# Patient Record
Sex: MALE | Race: WHITE | NOT HISPANIC OR LATINO | Employment: OTHER | URBAN - METROPOLITAN AREA
[De-identification: names, ages, dates, MRNs, and addresses within clinical notes are randomized per-mention and may not be internally consistent; named-entity substitution may affect disease eponyms.]

---

## 2020-09-17 ENCOUNTER — HOSPITAL ENCOUNTER (EMERGENCY)
Facility: HOSPITAL | Age: 25
Discharge: HOME | End: 2020-09-17
Attending: EMERGENCY MEDICINE
Payer: COMMERCIAL

## 2020-09-17 VITALS
HEART RATE: 88 BPM | SYSTOLIC BLOOD PRESSURE: 147 MMHG | OXYGEN SATURATION: 99 % | RESPIRATION RATE: 20 BRPM | DIASTOLIC BLOOD PRESSURE: 81 MMHG | TEMPERATURE: 98 F | BODY MASS INDEX: 42.95 KG/M2 | HEIGHT: 69 IN | WEIGHT: 290 LBS

## 2020-09-17 DIAGNOSIS — M54.32 BILATERAL SCIATICA: Primary | ICD-10-CM

## 2020-09-17 DIAGNOSIS — M54.31 BILATERAL SCIATICA: Primary | ICD-10-CM

## 2020-09-17 PROCEDURE — 96372 THER/PROPH/DIAG INJ SC/IM: CPT | Mod: ER

## 2020-09-17 PROCEDURE — 25000003 PHARM REV CODE 250: Mod: ER | Performed by: EMERGENCY MEDICINE

## 2020-09-17 PROCEDURE — 63600175 PHARM REV CODE 636 W HCPCS: Mod: ER | Performed by: EMERGENCY MEDICINE

## 2020-09-17 PROCEDURE — 99284 EMERGENCY DEPT VISIT MOD MDM: CPT | Mod: 25,ER

## 2020-09-17 RX ORDER — METHOCARBAMOL 750 MG/1
1500 TABLET, FILM COATED ORAL
Status: COMPLETED | OUTPATIENT
Start: 2020-09-17 | End: 2020-09-17

## 2020-09-17 RX ORDER — KETOROLAC TROMETHAMINE 30 MG/ML
30 INJECTION, SOLUTION INTRAMUSCULAR; INTRAVENOUS
Status: COMPLETED | OUTPATIENT
Start: 2020-09-17 | End: 2020-09-17

## 2020-09-17 RX ORDER — KETOROLAC TROMETHAMINE 10 MG/1
10 TABLET, FILM COATED ORAL EVERY 6 HOURS PRN
Qty: 12 TABLET | Refills: 0 | Status: SHIPPED | OUTPATIENT
Start: 2020-09-17 | End: 2020-09-20

## 2020-09-17 RX ORDER — METHOCARBAMOL 750 MG/1
1500 TABLET, FILM COATED ORAL EVERY 6 HOURS
Qty: 24 TABLET | Refills: 0 | Status: SHIPPED | OUTPATIENT
Start: 2020-09-17 | End: 2020-09-20

## 2020-09-17 RX ADMIN — METHOCARBAMOL 1500 MG: 750 TABLET ORAL at 10:09

## 2020-09-17 RX ADMIN — KETOROLAC TROMETHAMINE 30 MG: 30 INJECTION, SOLUTION INTRAMUSCULAR at 10:09

## 2020-09-18 NOTE — ED PROVIDER NOTES
Encounter Date: 9/17/2020    SCRIBE #1 NOTE: I, Merced Woodruff, am scribing for, and in the presence of,  Dr. Ray. I have scribed the following portions of the note - Other sections scribed: HPI, ROS, PE.       History     Chief Complaint   Patient presents with    Leg Pain     PT REPORTS PAIN FROM LOW BACK RADIATING DOWN RIGHT BUTTOCK AND RIGHT LEG SINCE YESTERDAY    Sciatica     Kirit Christianson is a 24 y.o. obese male who presents to the ED complaining of acute, nontraumatic bilateral buttock pain that radiates down legs bilaterally, with intermittent numbness to the right buttock area that began yesterday. He reports that the pain began gradually yesterday, and denies know inciting event, strenuous or unusual activity recently. Reports pain exacerbated by walking. Denies saddle anesthesia, fever, bowel or bladder incontinence, urinary symptoms. Denies history of back problems.      The history is provided by the patient. No  was used.     Review of patient's allergies indicates:  No Known Allergies  History reviewed. No pertinent past medical history.  History reviewed. No pertinent surgical history.  No family history on file.  Social History     Tobacco Use    Smoking status: Never Smoker    Smokeless tobacco: Never Used   Substance Use Topics    Alcohol use: Never     Frequency: Never    Drug use: Never     Review of Systems   Constitutional: Negative for fever.   Genitourinary: Negative for difficulty urinating, dysuria, enuresis, frequency, hematuria and urgency.   Musculoskeletal: Positive for myalgias (bilateral gluteal). Negative for back pain, gait problem and joint swelling.   Neurological: Positive for numbness (intermittent, currently resolved). Negative for weakness.   All other systems reviewed and are negative.      Physical Exam     Initial Vitals [09/17/20 2121]   BP Pulse Resp Temp SpO2   (!) 147/81 88 20 97.9 °F (36.6 °C) 99 %      MAP       --         Physical  Exam    Nursing note and vitals reviewed.  Constitutional: He appears well-developed and well-nourished. He is Obese .   HENT:   Head: Normocephalic and atraumatic.   Nose: Nose normal.   Eyes: Conjunctivae are normal.   Neck: Normal range of motion and phonation normal. Neck supple. No stridor present.   Cardiovascular: Normal rate and intact distal pulses.   Pulmonary/Chest: Effort normal. No stridor. No respiratory distress.   Abdominal: Normal appearance.   Musculoskeletal: Normal range of motion. No edema.      Right hip: Normal.      Lumbar back: He exhibits tenderness and spasm. He exhibits no bony tenderness and no swelling.        Back:       Comments: Negative straight leg raise. Patient localizes pain to bilateral gluteal area.   Neurological: He is alert and oriented to person, place, and time. Gait normal.   Skin: Skin is warm, dry and intact. No abrasion, no rash and no abscess noted. No erythema.   Psychiatric: He has a normal mood and affect. His behavior is normal.         ED Course   Procedures  Labs Reviewed - No data to display       Imaging Results    None          Medical Decision Making:   History:   Old Medical Records: I decided to obtain old medical records.            Scribe Attestation:   Scribe #1: I performed the above scribed service and the documentation accurately describes the services I performed. I attest to the accuracy of the note.    Attending Attestation:           Physician Attestation for Scribe:  Physician Attestation Statement for Scribe #1: I, Milo Ray, reviewed documentation, as scribed by Merced Woodruff in my presence, and it is both accurate and complete.           Labs Reviewed      No visits with results within 1 Day(s) from this visit.   Latest known visit with results is:   No results found for any previous visit.        Imaging Reviewed    Imaging Results    None         Medications given in ED    Medications   ketorolac injection 30 mg (30 mg Intramuscular Given  9/17/20 2224)   methocarbamoL tablet 1,500 mg (1,500 mg Oral Given 9/17/20 2223)         Note was created using voice recognition software. Note may have occasional typographical errors that may not have been identified and edited despite good roberta initial review prior to signing.               ED Course as of Sep 18 0153   Thu Sep 17, 2020   2242 Patient states he feels much better after meds    [DL]      ED Course User Index  [DL] Milo Ray MD            Clinical Impression:     ICD-10-CM ICD-9-CM   1. Bilateral sciatica  M54.31 724.3    M54.32                    1. Bilateral sciatica            ED Disposition Condition    Discharge Stable        ED Prescriptions     Medication Sig Dispense Start Date End Date Auth. Provider    ketorolac (TORADOL) 10 mg tablet Take 1 tablet (10 mg total) by mouth every 6 (six) hours as needed for Pain (take with food). 12 tablet 9/17/2020 9/20/2020 Milo Ray MD    methocarbamoL (ROBAXIN) 750 MG Tab Take 2 tablets (1,500 mg total) by mouth every 6 (six) hours. for 3 days 24 tablet 9/17/2020 9/20/2020 Milo Ray MD        Follow-up Information     Follow up With Specialties Details Why Contact Info    Your PCP  Call in 1 day to schedule an appointment, for re-evaluation of today's complaint, and ongoing care                                        Milo Ray MD  09/18/20 0153

## 2020-10-18 ENCOUNTER — HOSPITAL ENCOUNTER (EMERGENCY)
Facility: HOSPITAL | Age: 25
Discharge: HOME | End: 2020-10-18
Attending: EMERGENCY MEDICINE
Payer: COMMERCIAL

## 2020-10-18 VITALS
DIASTOLIC BLOOD PRESSURE: 69 MMHG | HEART RATE: 65 BPM | RESPIRATION RATE: 17 BRPM | OXYGEN SATURATION: 100 % | WEIGHT: 290 LBS | BODY MASS INDEX: 42.83 KG/M2 | SYSTOLIC BLOOD PRESSURE: 138 MMHG | TEMPERATURE: 98 F

## 2020-10-18 DIAGNOSIS — S91.114A: ICD-10-CM

## 2020-10-18 DIAGNOSIS — S92.531B: ICD-10-CM

## 2020-10-18 DIAGNOSIS — R60.9 SWELLING: ICD-10-CM

## 2020-10-18 DIAGNOSIS — S92.421B OPEN DISPLACED FRACTURE OF DISTAL PHALANX OF RIGHT GREAT TOE, INITIAL ENCOUNTER: ICD-10-CM

## 2020-10-18 DIAGNOSIS — S91.111A LACERATION OF GREAT TOE OF RIGHT FOOT, FOREIGN BODY PRESENCE UNSPECIFIED, NAIL DAMAGE STATUS UNSPECIFIED, INITIAL ENCOUNTER: Primary | ICD-10-CM

## 2020-10-18 LAB
ALBUMIN SERPL-MCNC: 4 G/DL (ref 3.3–5.5)
ALP SERPL-CCNC: 79 U/L (ref 42–141)
BILIRUB SERPL-MCNC: 0.4 MG/DL (ref 0.2–1.6)
BUN SERPL-MCNC: 13 MG/DL (ref 7–22)
CALCIUM SERPL-MCNC: 9.5 MG/DL (ref 8–10.3)
CHLORIDE SERPL-SCNC: 104 MMOL/L (ref 98–108)
CREAT SERPL-MCNC: 1 MG/DL (ref 0.6–1.2)
GLUCOSE SERPL-MCNC: 103 MG/DL (ref 73–118)
POC ALT (SGPT): 32 U/L (ref 10–47)
POC AST (SGOT): 28 U/L (ref 11–38)
POC TCO2: 30 MMOL/L (ref 18–33)
POTASSIUM BLD-SCNC: 3.8 MMOL/L (ref 3.6–5.1)
PROTEIN, POC: 8 G/DL (ref 6.4–8.1)
SODIUM BLD-SCNC: 140 MMOL/L (ref 128–145)

## 2020-10-18 PROCEDURE — 96366 THER/PROPH/DIAG IV INF ADDON: CPT | Mod: ER

## 2020-10-18 PROCEDURE — 12002 RPR S/N/AX/GEN/TRNK2.6-7.5CM: CPT | Mod: ER

## 2020-10-18 PROCEDURE — 96367 TX/PROPH/DG ADDL SEQ IV INF: CPT | Mod: ER,59

## 2020-10-18 PROCEDURE — 80053 COMPREHEN METABOLIC PANEL: CPT | Mod: ER

## 2020-10-18 PROCEDURE — 25000003 PHARM REV CODE 250: Mod: ER | Performed by: EMERGENCY MEDICINE

## 2020-10-18 PROCEDURE — 63600175 PHARM REV CODE 636 W HCPCS: Mod: ER | Performed by: EMERGENCY MEDICINE

## 2020-10-18 PROCEDURE — 85025 COMPLETE CBC W/AUTO DIFF WBC: CPT | Mod: ER

## 2020-10-18 PROCEDURE — 96368 THER/DIAG CONCURRENT INF: CPT | Mod: ER,59

## 2020-10-18 PROCEDURE — 96365 THER/PROPH/DIAG IV INF INIT: CPT | Mod: ER,59

## 2020-10-18 PROCEDURE — 96375 TX/PRO/DX INJ NEW DRUG ADDON: CPT | Mod: ER,59

## 2020-10-18 PROCEDURE — 90471 IMMUNIZATION ADMIN: CPT | Mod: ER | Performed by: EMERGENCY MEDICINE

## 2020-10-18 PROCEDURE — 99291 CRITICAL CARE FIRST HOUR: CPT | Mod: 25,ER

## 2020-10-18 PROCEDURE — 90715 TDAP VACCINE 7 YRS/> IM: CPT | Mod: ER | Performed by: EMERGENCY MEDICINE

## 2020-10-18 RX ORDER — VANCOMYCIN HCL IN 5 % DEXTROSE 1G/250ML
1000 PLASTIC BAG, INJECTION (ML) INTRAVENOUS
Status: COMPLETED | OUTPATIENT
Start: 2020-10-18 | End: 2020-10-18

## 2020-10-18 RX ORDER — MORPHINE SULFATE 4 MG/ML
4 INJECTION, SOLUTION INTRAMUSCULAR; INTRAVENOUS
Status: COMPLETED | OUTPATIENT
Start: 2020-10-18 | End: 2020-10-18

## 2020-10-18 RX ORDER — CEFAZOLIN SODIUM 1 G/50ML
1 SOLUTION INTRAVENOUS
Status: COMPLETED | OUTPATIENT
Start: 2020-10-18 | End: 2020-10-18

## 2020-10-18 RX ORDER — IBUPROFEN 600 MG/1
600 TABLET ORAL EVERY 6 HOURS PRN
Qty: 30 TABLET | Refills: 0 | Status: SHIPPED | OUTPATIENT
Start: 2020-10-18

## 2020-10-18 RX ORDER — BUPIVACAINE HYDROCHLORIDE 5 MG/ML
10 INJECTION, SOLUTION EPIDURAL; INTRACAUDAL
Status: COMPLETED | OUTPATIENT
Start: 2020-10-18 | End: 2020-10-18

## 2020-10-18 RX ORDER — MUPIROCIN 20 MG/G
OINTMENT TOPICAL
Status: COMPLETED | OUTPATIENT
Start: 2020-10-18 | End: 2020-10-18

## 2020-10-18 RX ORDER — LIDOCAINE HYDROCHLORIDE 10 MG/ML
10 INJECTION INFILTRATION; PERINEURAL
Status: COMPLETED | OUTPATIENT
Start: 2020-10-18 | End: 2020-10-18

## 2020-10-18 RX ORDER — OXYCODONE AND ACETAMINOPHEN 5; 325 MG/1; MG/1
1 TABLET ORAL
Status: DISCONTINUED | OUTPATIENT
Start: 2020-10-18 | End: 2020-10-18

## 2020-10-18 RX ORDER — ACETAMINOPHEN 500 MG
500 TABLET ORAL EVERY 6 HOURS PRN
Qty: 30 TABLET | Refills: 0 | Status: SHIPPED | OUTPATIENT
Start: 2020-10-18

## 2020-10-18 RX ORDER — CEFAZOLIN SODIUM 1 G/50ML
1 SOLUTION INTRAVENOUS ONCE
Status: COMPLETED | OUTPATIENT
Start: 2020-10-18 | End: 2020-10-18

## 2020-10-18 RX ORDER — OXYCODONE AND ACETAMINOPHEN 5; 325 MG/1; MG/1
1 TABLET ORAL EVERY 4 HOURS PRN
Qty: 12 TABLET | Refills: 0 | Status: SHIPPED | OUTPATIENT
Start: 2020-10-18

## 2020-10-18 RX ORDER — SULFAMETHOXAZOLE AND TRIMETHOPRIM 800; 160 MG/1; MG/1
2 TABLET ORAL 2 TIMES DAILY
Qty: 40 TABLET | Refills: 0 | Status: SHIPPED | OUTPATIENT
Start: 2020-10-18 | End: 2020-10-28

## 2020-10-18 RX ORDER — OXYCODONE AND ACETAMINOPHEN 5; 325 MG/1; MG/1
1 TABLET ORAL
Status: COMPLETED | OUTPATIENT
Start: 2020-10-18 | End: 2020-10-18

## 2020-10-18 RX ORDER — MUPIROCIN 20 MG/G
OINTMENT TOPICAL 3 TIMES DAILY
Qty: 1 TUBE | Refills: 0 | Status: SHIPPED | OUTPATIENT
Start: 2020-10-18 | End: 2020-10-28

## 2020-10-18 RX ADMIN — SODIUM CHLORIDE 1000 ML: 0.9 INJECTION, SOLUTION INTRAVENOUS at 03:10

## 2020-10-18 RX ADMIN — OXYCODONE HYDROCHLORIDE AND ACETAMINOPHEN 1 TABLET: 5; 325 TABLET ORAL at 03:10

## 2020-10-18 RX ADMIN — LIDOCAINE HYDROCHLORIDE 10 ML: 10 INJECTION, SOLUTION INFILTRATION; PERINEURAL at 04:10

## 2020-10-18 RX ADMIN — MUPIROCIN: 20 OINTMENT TOPICAL at 06:10

## 2020-10-18 RX ADMIN — VANCOMYCIN HYDROCHLORIDE 1000 MG: 1 INJECTION, POWDER, LYOPHILIZED, FOR SOLUTION INTRAVENOUS at 05:10

## 2020-10-18 RX ADMIN — MORPHINE SULFATE 4 MG: 4 INJECTION INTRAVENOUS at 03:10

## 2020-10-18 RX ADMIN — CLOSTRIDIUM TETANI TOXOID ANTIGEN (FORMALDEHYDE INACTIVATED), CORYNEBACTERIUM DIPHTHERIAE TOXOID ANTIGEN (FORMALDEHYDE INACTIVATED), BORDETELLA PERTUSSIS TOXOID ANTIGEN (GLUTARALDEHYDE INACTIVATED), BORDETELLA PERTUSSIS FILAMENTOUS HEMAGGLUTININ ANTIGEN (FORMALDEHYDE INACTIVATED), BORDETELLA PERTUSSIS PERTACTIN ANTIGEN, AND BORDETELLA PERTUSSIS FIMBRIAE 2/3 ANTIGEN 0.5 ML: 5; 2; 2.5; 5; 3; 5 INJECTION, SUSPENSION INTRAMUSCULAR at 03:10

## 2020-10-18 RX ADMIN — CEFAZOLIN SODIUM 1 G: 1 SOLUTION INTRAVENOUS at 06:10

## 2020-10-18 RX ADMIN — CEFAZOLIN SODIUM 1 G: 1 SOLUTION INTRAVENOUS at 03:10

## 2020-10-18 RX ADMIN — BUPIVACAINE HYDROCHLORIDE 50 MG: 5 INJECTION, SOLUTION EPIDURAL; INTRACAUDAL; PERINEURAL at 04:10

## 2020-10-18 NOTE — Clinical Note
"Kennedyas "Kirit" Sammy was seen and treated in our emergency department on 10/18/2020.  He may return to work after being cleared by follow-up physician 10/25/2020.       If you have any questions or concerns, please don't hesitate to call.      Argenis Heredia, DO"

## 2020-10-18 NOTE — ED PROVIDER NOTES
"Encounter Date: 10/18/2020    SCRIBE #1 NOTE: I, Sandra Stephenson, am scribing for, and in the presence of,  Dr. Heredia. I have scribed the following portions of the note - Other sections scribed: HPI, ROS, PE.       History     Chief Complaint   Patient presents with    Laceration     Pt states," I cut my right big toe and the toe next to it today with a ."     Kirit Christianson is a 25 y.o. male who presents to the ED complaining of a laceration and pain s/p lawn  accident after tripping and falling onset PTA. He describes the pain as "burning" and rates the severity as 8/10 currently. Patient last ate around 12PM. Denies alcohol use, smoking, marijuana or street drug use. Patient has no known allergies. Denies past medical history and does not take medication daily. Tetanus is not UTD.    The history is provided by the patient. No  was used.     Review of patient's allergies indicates:  No Known Allergies  History reviewed. No pertinent past medical history.  History reviewed. No pertinent surgical history.  History reviewed. No pertinent family history.  Social History     Tobacco Use    Smoking status: Never Smoker    Smokeless tobacco: Never Used   Substance Use Topics    Alcohol use: Never     Frequency: Never    Drug use: Never     Review of Systems   Constitutional: Negative for chills and fever.   Respiratory: Negative for shortness of breath.    Cardiovascular: Negative for chest pain.   Gastrointestinal: Negative for diarrhea, nausea and vomiting.   Genitourinary: Negative for dysuria.   Skin: Positive for wound (with pain to 1st and 2nd right toes ).   Neurological: Negative for headaches.   All other systems reviewed and are negative.      Physical Exam     Initial Vitals [10/18/20 1450]   BP Pulse Resp Temp SpO2   (!) 143/80 79 16 98 °F (36.7 °C) 100 %      MAP       --         Patient gave consent to have physical exam performed.    Physical Exam    Nursing note and vitals " reviewed.  Constitutional: He appears well-developed and well-nourished.   HENT:   Head: Normocephalic and atraumatic.   Right Ear: External ear normal.   Left Ear: External ear normal.   Nose: Nose normal.   Mouth/Throat: Oropharynx is clear and moist.   Eyes: Conjunctivae and EOM are normal. Pupils are equal, round, and reactive to light.   Neck: Normal range of motion. Neck supple.   Cardiovascular: Normal rate, regular rhythm, S1 normal, S2 normal, normal heart sounds and intact distal pulses. Exam reveals no gallop and no friction rub.    No murmur heard.  Pulmonary/Chest: Effort normal and breath sounds normal. No stridor. No respiratory distress. He has no wheezes. He has no rhonchi. He has no rales. He exhibits no tenderness.   Abdominal: Soft. Normal appearance and bowel sounds are normal. He exhibits no distension and no mass. There is no abdominal tenderness. There is no rigidity, no rebound and no guarding.   Musculoskeletal: Normal range of motion.        Right foot: Laceration present.      Comments: Multiple lacerations to bilateral toes, 1st and 2nd. (refer to images): Superficial lacerations to distal right big toe, 3 cm laceration to right big toe, v-shaped laceration on 2nd toe 0.5 cm in each direction, and 1.5 cm laceration to base of 2nd toe. Patient cut his nail on right big toe, but there was no damage to nailbed.   Neurological: He is alert and oriented to person, place, and time. No cranial nerve deficit or sensory deficit. GCS score is 15. GCS eye subscore is 4. GCS verbal subscore is 5. GCS motor subscore is 6.   Skin: Skin is warm and dry. Capillary refill takes less than 2 seconds. No rash noted.   Psychiatric: He has a normal mood and affect. His behavior is normal.                 ED Course   Critical Care    Date/Time: 10/18/2020 4:44 PM  Performed by: Argenis Heredia DO  Authorized by: Argenis Heredia DO   Direct patient critical care time: 30 minutes  Additional history critical care  time: 8 minutes  Ordering / reviewing critical care time: 8 minutes  Documentation critical care time: 8 minutes  Consulting other physicians critical care time: 15 minutes  Total critical care time (exclusive of procedural time) : 69 minutes  Critical care was necessary to treat or prevent imminent or life-threatening deterioration of the following conditions: trauma.  Critical care was time spent personally by me on the following activities: development of treatment plan with patient or surrogate, discussions with consultants, evaluation of patient's response to treatment, examination of patient, obtaining history from patient or surrogate, ordering and performing treatments and interventions, ordering and review of laboratory studies, ordering and review of radiographic studies, pulse oximetry, re-evaluation of patient's condition and review of old charts.    Lac Repair    Date/Time: 10/18/2020 4:58 PM  Performed by: Argenis Heredia DO  Authorized by: Argenis Heredia DO     Consent:     Consent obtained:  Verbal    Consent given by:  Patient    Risks discussed:  Pain, infection, retained foreign body and need for additional repair (fracture)  Universal protocol:     Procedure explained and questions answered to patient or proxy's satisfaction: yes      Relevant documents present and verified: yes      Test results available and properly labeled: yes      Imaging studies available: yes      Site/side marked: yes      Patient identity confirmed:  Verbally with patient  Anesthesia (see MAR for exact dosages):     Anesthesia method:  Nerve block    Block location:  1st and 2nd right toes    Block needle gauge:  27 G    Block anesthetic:  Lidocaine 1% w/o epi and bupivacaine 0.5% w/o epi    Block injection procedure:  Introduced needle    Block outcome:  Anesthesia achieved  Laceration details:     Location:  Toe    Toe location: 1st and 2nd right toes.  Pre-procedure details:     Preparation:  Patient was prepped and  draped in usual sterile fashion and imaging obtained to evaluate for foreign bodies  Exploration:     Wound extent: foreign bodies/material      Contaminated: yes    Treatment:     Area cleansed with:  Betadine    Amount of cleaning:  Extensive    Irrigation solution:  Sterile water    Irrigation method:  Syringe  Skin repair:     Repair method:  Sutures    Suture size:  3-0    Suture material:  Prolene  Post-procedure details:     Dressing:  Splint for protection and antibiotic ointment    Patient tolerance of procedure:  Tolerated well, no immediate complications  Comments:      Superficial lacerations to distal right big toe approximated with Dermabond, 3 cm laceration to right big toe loosely approximated with 4 sutures, v-shaped laceration on 2nd toe 0.5 cm in each direction loosely approximated with 3 sutures, and 1.5 cm laceration to base of 2nd toe closely approximated with 2 sutures.  Nerve Block    Date/Time: 10/18/2020 5:14 PM  Location procedure was performed: [facility] EMERGENCY DEPARTMENT  Performed by: Argenis Heredia DO  Authorized by: Argenis Heredia DO   Consent Done: Yes  Consent: Verbal consent obtained.  Risks and benefits: risks, benefits and alternatives were discussed  Consent given by: patient  Patient understanding: patient states understanding of the procedure being performed  Patient consent: the patient's understanding of the procedure matches consent given  Procedure consent: procedure consent matches procedure scheduled  Site marked: the operative site was marked  Imaging studies: imaging studies available  Patient identity confirmed: verbally with patient  Indications: pain relief, fracture and debridement  Body area: lower extremity  Nerve: digital  Laterality: right  Patient sedated: no  Preparation: Patient was prepped and draped in the usual sterile fashion.  Patient position: sitting  Needle size: 27 G  Local Anesthetic: bupivacaine 0.5% without epinephrine and lidocaine 1% without  epinephrine  Anesthetic total: 5 mL  Complications: No  Specimens: No  Implants: No  Outcome: pain improved  Patient tolerance: Patient tolerated the procedure well with no immediate complications    Splint Application    Date/Time: 10/18/2020 6:41 PM  Performed by: Argenis Heredia DO  Authorized by: Argenis Heredia DO   Consent Done: Yes  Consent: Verbal consent obtained.  Risks and benefits: risks, benefits and alternatives were discussed  Consent given by: patient  Patient understanding: patient states understanding of the procedure being performed  Patient consent: the patient's understanding of the procedure matches consent given  Procedure consent: procedure consent matches procedure scheduled  Relevant documents: relevant documents present and verified  Test results: test results available and properly labeled  Site marked: the operative site was marked  Imaging studies: imaging studies available  Patient identity confirmed: verbally with patient  Location: right foot.  Splint type: pre-fabricated walking boot.  Post-procedure: The splinted body part was neurovascularly unchanged following the procedure.  Patient tolerance: Patient tolerated the procedure well with no immediate complications        Labs Reviewed   POCT CBC   POCT CMP   POCT CMP              Imaging Results          X-Ray Foot Complete Right (Final result)  Result time 10/18/20 15:25:43    Final result by Glenn Azul MD (10/18/20 15:25:43)                 Impression:      1. Fractures of the 1st and 2nd toes as described above noting possible radiopaque foreign body within the plantar soft tissues.  Please see above.      Electronically signed by: Glenn Azul MD  Date:    10/18/2020  Time:    15:25             Narrative:    EXAMINATION:  XR FOOT COMPLETE 3 VIEW RIGHT    CLINICAL HISTORY:  . Edema, unspecified    TECHNIQUE:  AP, lateral, and oblique views of the right foot were performed.    COMPARISON:  None    FINDINGS:  Three views  right foot.    There is obliquely oriented fracture of the distal phalanx of the 2nd toe.  Additional impacted fracture is noted of the distal aspect of the proximal phalanx of the 2nd toe.  Distal phalanx fracture may involve the lateral aspect of the DIP joint.  There is a well corticated ossific fragment along the medial aspect of the great toe DIP joint, concerning for small avulsion fragment.  There is edema involving the toes and along the plantar aspect at the level of the metatarsophalangeal joints.  In this location, there are a few high attenuating foci, possibly radiopaque foreign bodies versus material external to the patient, correlation is advised.                                 Medical Decision Making:   History:   Old Medical Records: I decided to obtain old medical records.  Clinical Tests:   Radiological Study: Reviewed and Ordered  Medical decision making   Chief complaint: laceration and pain  Differential diagnosis: laceration, abrasion, fracture, open fracture, and toe amputation.    Treatment in the ED: PE, IV fluids. Tdap, Percocet, Ancef, and morphine.  Patient reports feeling better after treatment in the ER.      Discussed treatment, prescriptions, labs, and imaging results.    4:12 PM I talked to the podiatry resident on call for [facility], Dr. Colby, and he said that he will call back.   4:20 PM Podiatry staff declined the patient and recommends to consulting the on-call orthopedic physician for traumatic injury. 4:27 PM I spoke to the orthopedic doctor, Dr. Rodriguez. He wants 2 g of Ancef and 1 g of Vancomycin.  Copious irrigation, repair in the ER, and follow-up in clinic 1:00 p.m. on Tuesday the 20th.  Discussed discharge plan at length with patient and family.  They expressed understanding.    Discharge home with Bactrim, ibuprofen, Tylenol, Percocet, and Bactroban.   Fill and take prescriptions as directed.  Return to the ED if symptoms worsen or do not resolve.   Answered  questions and discussed discharge plan.    Patient feels better and is ready for discharge.  Follow up with PCP/specialist in 1 day.          Scribe Attestation:   Scribe #1: I performed the above scribed service and the documentation accurately describes the services I performed. I attest to the accuracy of the note.     I, Dr. Argenis Heredia, personally performed the services described in this documentation. This document was produced by a scribe under my direction and in my presence. All medical record entries made by the scribe were at my direction and in my presence.  I have reviewed the chart and agree that the record reflects my personal performance and is accurate and complete. Argenis Heredia DO.     10/21/2020 7:50 AM                    Clinical Impression:       ICD-10-CM ICD-9-CM   1. Laceration of great toe of right foot, foreign body presence unspecified, nail damage status unspecified, initial encounter  S91.111A 893.0   2. Swelling  R60.9 782.3   3. Laceration of lesser toe of right foot, foreign body presence unspecified, nail damage status unspecified, initial encounter  S91.114A 893.0   4. Open displaced fracture of distal phalanx of right great toe, initial encounter  S92.421B 826.1   5. Open fracture of distal phalanx of lesser toe of right foot, physeal involvement unspecified, initial encounter  S92.531B 826.1                          ED Disposition Condition    Discharge Stable        ED Prescriptions     Medication Sig Dispense Start Date End Date Auth. Provider    sulfamethoxazole-trimethoprim 800-160mg (BACTRIM DS) 800-160 mg Tab Take 2 tablets by mouth 2 (two) times daily. for 10 days 40 tablet 10/18/2020 10/28/2020 Argenis Heredia DO    ibuprofen (ADVIL,MOTRIN) 600 MG tablet Take 1 tablet (600 mg total) by mouth every 6 (six) hours as needed for Pain (Take with food as needed for mild-to-moderate pain). 30 tablet 10/18/2020  Argenis Heredia DO    acetaminophen (TYLENOL) 500 MG tablet  Take 1 tablet (500 mg total) by mouth every 6 (six) hours as needed for Pain. 30 tablet 10/18/2020  Argenis Heredia DO    oxyCODONE-acetaminophen (PERCOCET) 5-325 mg per tablet Take 1 tablet by mouth every 4 (four) hours as needed for Pain (As needed for severe 10/10 pain). 12 tablet 10/18/2020  Argenis Heredia DO    mupirocin (BACTROBAN) 2 % ointment Apply topically 3 (three) times daily. for 10 days 1 Tube 10/18/2020 10/28/2020 Argenis Heredia DO        Follow-up Information     Follow up With Specialties Details Why Contact Info    Rolando Rodriguez MD Orthopedic Surgery  [address] [facility] [address] [facility] [address] [place] [address] [phone] [address]  [facility]  [place] [address]  [phone]      [facility] Orthopedic Surgery, Bone Marrow Transplant Go to  Please follow-up in clinic at 1:00 p.m. on Tuesday the 20th.  Call clinic 1st thing Monday morning to let them know that you are supposed to see the on-call orthopedic doctor at 1:00 a.m. on Tuesday the 20th they will which on the schedule [address] [place] [address]  [phone]      [facility] Emergency Medicine Go to  Please go to [facility] emergency department if symptoms worsen, If symptoms worsen [address]  [facility] [id number] [phone]                                       Argenis Heredia DO  10/21/20 3840

## 2020-10-18 NOTE — DISCHARGE INSTRUCTIONS
Go to the clinic at [address] [place] [address] [phone]    Please follow-up in clinic at 1:00 p.m. on Tuesday the 20th.    Call clinic at [phone] 1st thing Monday morning to let them know that you are supposed to see the on-call orthopedic doctor at 1:00 a.m. on Tuesday the 20th they put you on the schedule.

## 2024-06-11 ENCOUNTER — TELEPHONE (OUTPATIENT)
Dept: UROLOGY | Facility: CLINIC | Age: 29
End: 2024-06-11

## 2024-06-11 NOTE — TELEPHONE ENCOUNTER
Call was placed to patient informing him of any completed SA patient stated e went to [facility] he completed 2 I informed him I will get the results emailed over from .

## 2024-06-28 ENCOUNTER — PATIENT MESSAGE (OUTPATIENT)
Dept: UROLOGY | Facility: CLINIC | Age: 29
End: 2024-06-28

## 2024-07-01 ENCOUNTER — TELEPHONE (OUTPATIENT)
Dept: UROLOGY | Facility: CLINIC | Age: 29
End: 2024-07-01

## 2024-07-01 ENCOUNTER — OFFICE VISIT (OUTPATIENT)
Dept: UROLOGY | Facility: CLINIC | Age: 29
End: 2024-07-01

## 2024-07-01 ENCOUNTER — LAB VISIT (OUTPATIENT)
Dept: LAB | Facility: HOSPITAL | Age: 29
End: 2024-07-01
Attending: UROLOGY

## 2024-07-01 VITALS
SYSTOLIC BLOOD PRESSURE: 141 MMHG | HEART RATE: 68 BPM | BODY MASS INDEX: 48.09 KG/M2 | WEIGHT: 315 LBS | DIASTOLIC BLOOD PRESSURE: 92 MMHG

## 2024-07-01 DIAGNOSIS — E29.1 TESTICULAR FAILURE: ICD-10-CM

## 2024-07-01 DIAGNOSIS — E29.1 TESTICULAR FAILURE: Primary | ICD-10-CM

## 2024-07-01 LAB
ESTRADIOL SERPL-MCNC: 20 PG/ML (ref 11–44)
FSH SERPL-ACNC: 4.63 MIU/ML (ref 0.95–11.95)
LH SERPL-ACNC: 2.4 MIU/ML (ref 0.6–12.1)
PROLACTIN SERPL IA-MCNC: 9.9 NG/ML (ref 3.5–19.4)
TESTOST SERPL-MCNC: 252 NG/DL (ref 304–1227)

## 2024-07-01 PROCEDURE — 83001 ASSAY OF GONADOTROPIN (FSH): CPT | Performed by: UROLOGY

## 2024-07-01 PROCEDURE — 84403 ASSAY OF TOTAL TESTOSTERONE: CPT | Performed by: UROLOGY

## 2024-07-01 PROCEDURE — 36415 COLL VENOUS BLD VENIPUNCTURE: CPT | Performed by: UROLOGY

## 2024-07-01 PROCEDURE — 99213 OFFICE O/P EST LOW 20 MIN: CPT | Mod: PBBFAC | Performed by: UROLOGY

## 2024-07-01 PROCEDURE — 82670 ASSAY OF TOTAL ESTRADIOL: CPT | Performed by: UROLOGY

## 2024-07-01 PROCEDURE — 84146 ASSAY OF PROLACTIN: CPT | Performed by: UROLOGY

## 2024-07-01 PROCEDURE — 99999 PR PBB SHADOW E&M-EST. PATIENT-LVL III: CPT | Mod: PBBFAC,,, | Performed by: UROLOGY

## 2024-07-01 PROCEDURE — 83002 ASSAY OF GONADOTROPIN (LH): CPT | Performed by: UROLOGY

## 2024-07-01 PROCEDURE — 99204 OFFICE O/P NEW MOD 45 MIN: CPT | Mod: S$PBB,,, | Performed by: UROLOGY

## 2024-07-01 NOTE — LETTER
July 1, 2024        Raven Lucia MD  [address]  [facility]  [facility] [address]             [facility] 4th Fl  [address]  [place] [address]  Phone: [phone]   Patient: Kirit Christianson   MR Number: [id number]   YOB: 1995   Date of Visit: 7/1/2024       Dear Dr. Lucia:    Thank you for referring Kirit Christianson to me for evaluation. Attached you will find relevant portions of my assessment and plan of care.    If you have questions, please do not hesitate to call me. I look forward to following Kirit Christianson along with you.    Sincerely,      Moy Shields MD            CC  No Recipients    Enclosure

## 2024-07-01 NOTE — TELEPHONE ENCOUNTER
Per MD: Please notify T is low.  It should be repeated between 7-10 am.    Pt verbalized understanding. Lab scheduled for 7/2, in the only available slot at noon. Pt instructed to go before 10am.

## 2024-07-01 NOTE — PROGRESS NOTES
"Chief Complaint:  Infertility    HPI:    Mr. Christianson is a 28 y.o.  male who has been  to his wife for the past 2 years. They have been trying to achieve a pregnancy for the past 2 years but without success. 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 He denies a history of erectile dysfunction and ejaculatory problems.    He has achieved 0 pregnancies in the past.    Karyotype and Y chromosome microdeltion.    SA 24--2.3 cc/azoospermia  SA 24--2.5 cc/0.4 million per cc/50%/1%    Keiry Casarez is 20 years old. ( [birth year]) Her menses are regular. She has not undergone prior hysterosalpingogram. She has achieved 0 prior pregnancies.  She sees Dr. Lucia.    The couple has not undergone prior intrauterine insemination procedures.    The couple has not undergone prior in-vitro fertilization procedures.    Kirit Christianson denies a history of exposure to harmful chemicals, toxins, and radiation.    No history of recent fevers greater than 101.5 degrees Farenheit.    No history of recent exposure to "wet heat."    No history of urological trauma or testicular torsion.    No history of prostatitis, epididymitis, and orchitis.    No history of post-pubertal mumps.    There is no known family history of fertility problems.    REVIEW OF SYSTEMS:     He denies headache, blurred vision, fever, nausea, vomiting, chills, abdominal pain, chest pain, sore throat, bleeding per rectum, cough, SOB, recent loss of consciousness, recent mental status changes, seizures, dizziness, or upper or lower extremity weakness.    PHYSICAL EXAM:     The patient generally appears in good health, is appropriately interactive, and is in no apparent distress.     Eyes: anicteric sclerae, moist conjunctivae; no lid-lag; PERRLA     HENT: Atraumatic; oropharynx clear with moist mucous membranes and no mucosal ulcerations;normal hard and soft palate.  No evidence of " "lymphadenopathy.    Neck: Trachea midline.  No thyromegaly.    Skin: No lesions.    Mental: Cooperative with normal affect.  Is oriented to time, place, and person.    Neuro: Grossly intact.    Chest: Normal inspiratory effort.   No accessory muscles.  No audible wheezes.  Respirations symmetric on inspiration and expiration.    Heart: Regular rhythm.      Abdomen:  Soft, non-tender. No masses or organomegaly. Bladder is not palpable. No evidence of flank discomfort. No evidence of inguinal hernia.    Genitourinary: Penis is normal with no evidence of plaques or induration. Urethral meatus is normal. Scrotum is normal. Testes are descended bilaterally with no evidence of abnormal masses or tenderness. Epididymis, vas deferens, and cord structures are normal bilaterally.  Testicular volume is approximately 18 cc on the R and 17 cc on the L.  Limited by body habitus.    Extremities: No cyanosis, clubbing, or edema.    IMPRESSION & PLAN:    Mr. Christianson is a 28 y.o.  male who has been  to his wife for the past 2 years. They have been trying to achieve a pregnancy for the past 2 years but without success. 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 He denies a history of erectile dysfunction and ejaculatory problems.    He has achieved 0 pregnancies in the past.    Karyotype and Y chromosome microdeltion.    SA 5/14/24--2.3 cc/azoospermia  SA 5/24/24--2.5 cc/0.4 million per cc/50%/1%    1.  FSH, LH, testosterone, prolactin, and estradiol serum levels today.  2.  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  3.  Discussed that his SA's are discordant.  Recommend a 3rd SA with cryopreservation.  4.  Recommend avoiding "wet heat."  5.  Recommend taking a multivitamin and 500 mg of vitamin c daily in addition to the multivitamin.  6.  Please send a copy of the note to Dr. Lucia.  Thank you for " the consultation.    CC: Khari

## 2024-07-02 ENCOUNTER — LAB VISIT (OUTPATIENT)
Dept: LAB | Facility: HOSPITAL | Age: 29
End: 2024-07-02
Attending: UROLOGY

## 2024-07-02 DIAGNOSIS — E29.1 TESTICULAR FAILURE: ICD-10-CM

## 2024-07-02 LAB — TESTOST SERPL-MCNC: 237 NG/DL (ref 304–1227)

## 2024-07-02 PROCEDURE — 84403 ASSAY OF TOTAL TESTOSTERONE: CPT | Performed by: UROLOGY

## 2024-07-02 PROCEDURE — 36415 COLL VENOUS BLD VENIPUNCTURE: CPT | Mod: PO | Performed by: UROLOGY

## 2024-07-03 ENCOUNTER — TELEPHONE (OUTPATIENT)
Dept: UROLOGY | Facility: CLINIC | Age: 29
End: 2024-07-03

## 2024-07-03 DIAGNOSIS — E29.1 TESTICULAR FAILURE: Primary | ICD-10-CM

## 2024-07-03 NOTE — TELEPHONE ENCOUNTER
Per MD: Please notify T is low, but he didn't get the lab drawn before 10 am as instructed.   Therefore, it need to be repeated.    Pt unable to be reached via phone. Left  with clinic #.

## 2024-07-03 NOTE — TELEPHONE ENCOUNTER
----- Message from Mary Keith sent at 7/3/2024  2:19 PM CDT -----  Regarding: Missed call  Contact: [phone]  Caller is returning a missed called from Harleen West RN in the office. Please call back as soon as possible.

## 2024-07-03 NOTE — TELEPHONE ENCOUNTER
Call was placed back to patient informing him to return to the same lab this Friday BEFORE 10AM!. Patient was instructed to disregard the time they had on the confirmation Patient agreed and understood.

## 2024-07-03 NOTE — TELEPHONE ENCOUNTER
Please notify T is low, but he didn't get the lab drawn before 10 am as instructed.   Therefore, it need to be repeated.

## 2024-07-05 ENCOUNTER — LAB VISIT (OUTPATIENT)
Dept: LAB | Facility: HOSPITAL | Age: 29
End: 2024-07-05
Attending: UROLOGY

## 2024-07-05 DIAGNOSIS — E29.1 TESTICULAR FAILURE: ICD-10-CM

## 2024-07-05 LAB — TESTOST SERPL-MCNC: 206 NG/DL (ref 304–1227)

## 2024-07-05 PROCEDURE — 36415 COLL VENOUS BLD VENIPUNCTURE: CPT | Mod: PO | Performed by: UROLOGY

## 2024-07-05 PROCEDURE — 84403 ASSAY OF TOTAL TESTOSTERONE: CPT | Performed by: UROLOGY

## 2024-07-17 ENCOUNTER — OFFICE VISIT (OUTPATIENT)
Dept: UROLOGY | Facility: CLINIC | Age: 29
End: 2024-07-17

## 2024-07-17 VITALS
WEIGHT: 315 LBS | BODY MASS INDEX: 46.65 KG/M2 | HEART RATE: 76 BPM | SYSTOLIC BLOOD PRESSURE: 136 MMHG | HEIGHT: 69 IN | DIASTOLIC BLOOD PRESSURE: 67 MMHG

## 2024-07-17 DIAGNOSIS — E29.1 TESTICULAR FAILURE: Primary | ICD-10-CM

## 2024-07-17 PROCEDURE — 99213 OFFICE O/P EST LOW 20 MIN: CPT | Mod: PBBFAC | Performed by: UROLOGY

## 2024-07-17 PROCEDURE — 99214 OFFICE O/P EST MOD 30 MIN: CPT | Mod: S$PBB,,, | Performed by: UROLOGY

## 2024-07-17 PROCEDURE — 99999 PR PBB SHADOW E&M-EST. PATIENT-LVL III: CPT | Mod: PBBFAC,,, | Performed by: UROLOGY

## 2024-07-17 RX ORDER — CLOMIPHENE CITRATE 50 MG/1
TABLET ORAL
Qty: 15 TABLET | Refills: 5 | Status: SHIPPED | OUTPATIENT
Start: 2024-07-17

## 2024-07-17 RX ORDER — ANASTROZOLE 1 MG/1
1 TABLET ORAL DAILY
Qty: 30 TABLET | Refills: 5 | Status: SHIPPED | OUTPATIENT
Start: 2024-07-17

## 2024-07-17 NOTE — PROGRESS NOTES
"Chief Complaint:  Infertility    HPI:    Mr. Christianson is a 28 y.o.  male who has been  to his wife for the past 2 years. They have been trying to achieve a pregnancy for the past 2 years but without success. 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 He denies a history of erectile dysfunction and ejaculatory problems.    Lab Results   Component Value Date    TOTALTESTOST 206 (L) 2024    LABLH 2.4 2024    FSH 4.63 2024    ESTRADIOL 20 2024    PROLACTIN 9.9 2024      SA 24--2.3 cc/azoospermia  SA 24--2.5 cc/0.4 million per cc/50%/1%  SA (cryo report) 7/3/24--4.0 cc/0.1 million per cc/50%/NA    FOR REVIEW FROM PREVIOUS:    Mr. Christianson is a 28 y.o.  male who has been  to his wife for the past 2 years. They have been trying to achieve a pregnancy for the past 2 years but without success. 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 He denies a history of erectile dysfunction and ejaculatory problems.    He has achieved 0 pregnancies in the past.    SA 24--2.3 cc/azoospermia  SA 24--2.5 cc/0.4 million per cc/50%/1%    Keiry Casarez is 19 years old. ( 05) Her menses are regular. She has not undergone prior hysterosalpingogram. She has achieved 0 prior pregnancies.  She sees Dr. Lucia.    The couple has not undergone prior intrauterine insemination procedures.    The couple has not undergone prior in-vitro fertilization procedures.    Kirit Christianson denies a history of exposure to harmful chemicals, toxins, and radiation.    No history of recent fevers greater than 101.5 degrees Farenheit.    No history of recent exposure to "wet heat."    No history of urological trauma or testicular torsion.    No history of prostatitis, epididymitis, and orchitis.    No history of post-pubertal mumps.    There is no known " family history of fertility problems.    REVIEW OF SYSTEMS:     He denies headache, blurred vision, fever, nausea, vomiting, chills, abdominal pain, chest pain, sore throat, bleeding per rectum, cough, SOB, recent loss of consciousness, recent mental status changes, seizures, dizziness, or upper or lower extremity weakness.    PHYSICAL EXAM:     The patient generally appears in good health, is appropriately interactive, and is in no apparent distress.     Eyes: anicteric sclerae, moist conjunctivae; no lid-lag; PERRLA     HENT: Atraumatic; oropharynx clear with moist mucous membranes and no mucosal ulcerations;normal hard and soft palate.  No evidence of lymphadenopathy.    Neck: Trachea midline.  No thyromegaly.    Skin: No lesions.    Mental: Cooperative with normal affect.  Is oriented to time, place, and person.    Neuro: Grossly intact.    Chest: Normal inspiratory effort.   No accessory muscles.  No audible wheezes.  Respirations symmetric on inspiration and expiration.    Heart: Regular rhythm.      Abdomen:  Soft, non-tender. No masses or organomegaly. Bladder is not palpable. No evidence of flank discomfort. No evidence of inguinal hernia.    Genitourinary: Penis is normal with no evidence of plaques or induration. Urethral meatus is normal. Scrotum is normal. Testes are descended bilaterally with no evidence of abnormal masses or tenderness. Epididymis, vas deferens, and cord structures are normal bilaterally.  Testicular volume is approximately 18 cc on the R and 17 cc on the L.  Limited by body habitus.    Extremities: No cyanosis, clubbing, or edema.    IMPRESSION & PLAN:    Mr. Christianson is a 28 y.o.  male who has been  to his wife for the past 2 years. They have been trying to achieve a pregnancy for the past 2 years but without success. 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 He denies a history of erectile  "dysfunction and ejaculatory problems.    Lab Results   Component Value Date    TOTALTESTOST 206 (L) 07/05/2024    LABLH 2.4 07/01/2024    FSH 4.63 07/01/2024    ESTRADIOL 20 07/01/2024    PROLACTIN 9.9 07/01/2024       5/14/24--2.3 cc/azoospermia  SA 5/24/24--2.5 cc/0.4 million per cc/50%/1%  SA (cryo report) 7/3/24--4.0 cc/0.1 million per cc/50%/NA    1.  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  2.  Discussed that his T is low.  Recommend clomid and arimidex. Side effects discussed.  A new Rx was given   3.  Will check a T and estradiol in 1 month.  Will also draw karyotype and Y chromosome microdeletion.  4.  Recommend avoiding "wet heat."  5.  Recommend taking a multivitamin and 500 mg of vitamin c daily in addition to the multivitamin.  6.  Should keep his cryopreserved SA as a backup.  7.  RTC 3 months with a SA x 1.    CC: Khari        "

## 2024-07-17 NOTE — LETTER
July 17, 2024        Raven Lucia MD  [address]  [facility]  [facility] [address]             [facility]  [address]  [place] [address]  Phone: [phone]   Patient: Kirit Christianson   MR Number: [id number]   YOB: 1995   Date of Visit: 7/17/2024       Dear Dr. Lucia:    Thank you for referring Kirit Christianson to me for evaluation. Attached you will find relevant portions of my assessment and plan of care.    If you have questions, please do not hesitate to call me. I look forward to following Kirit Christianson along with you.    Sincerely,      Moy Shields MD            CC  No Recipients    Enclosure

## 2024-08-19 ENCOUNTER — LAB VISIT (OUTPATIENT)
Dept: LAB | Facility: HOSPITAL | Age: 29
End: 2024-08-19
Attending: UROLOGY

## 2024-08-19 DIAGNOSIS — E29.1 TESTICULAR FAILURE: ICD-10-CM

## 2024-08-19 LAB
ESTRADIOL SERPL-MCNC: 19 PG/ML (ref 11–44)
REASON FOR REFERRAL, CHROMOSOME ANALYSIS CONGENITAL: NORMAL
TESTOST SERPL-MCNC: 941 NG/DL (ref 304–1227)

## 2024-08-19 PROCEDURE — 36415 COLL VENOUS BLD VENIPUNCTURE: CPT | Mod: PO | Performed by: UROLOGY

## 2024-08-19 PROCEDURE — 82670 ASSAY OF TOTAL ESTRADIOL: CPT | Performed by: UROLOGY

## 2024-08-19 PROCEDURE — 81403 MOPATH PROCEDURE LEVEL 4: CPT | Performed by: UROLOGY

## 2024-08-19 PROCEDURE — 84403 ASSAY OF TOTAL TESTOSTERONE: CPT | Performed by: UROLOGY

## 2024-10-17 ENCOUNTER — OFFICE VISIT (OUTPATIENT)
Dept: UROLOGY | Facility: CLINIC | Age: 29
End: 2024-10-17

## 2024-10-17 VITALS
SYSTOLIC BLOOD PRESSURE: 128 MMHG | HEIGHT: 69 IN | DIASTOLIC BLOOD PRESSURE: 74 MMHG | HEART RATE: 63 BPM | BODY MASS INDEX: 46.65 KG/M2 | WEIGHT: 315 LBS

## 2024-10-17 DIAGNOSIS — E29.1 TESTICULAR FAILURE: ICD-10-CM

## 2024-10-17 PROCEDURE — 99999 PR PBB SHADOW E&M-EST. PATIENT-LVL III: CPT | Mod: PBBFAC,,, | Performed by: UROLOGY

## 2024-10-17 PROCEDURE — 99213 OFFICE O/P EST LOW 20 MIN: CPT | Mod: PBBFAC | Performed by: UROLOGY

## 2024-10-17 RX ORDER — ANASTROZOLE 1 MG/1
1 TABLET ORAL DAILY
Qty: 30 TABLET | Refills: 5 | Status: CANCELLED | OUTPATIENT
Start: 2024-10-17

## 2024-10-17 RX ORDER — CLOMIPHENE CITRATE 50 MG/1
TABLET ORAL
Qty: 15 TABLET | Refills: 5 | Status: CANCELLED | OUTPATIENT
Start: 2024-10-17

## 2024-10-17 NOTE — PROGRESS NOTES
Chief Complaint:  Infertility    HPI:    Mr. Christianson is a 29 y.o.  male who has been  to his wife for the past 2 years. They have been trying to achieve a pregnancy for the past 2 years but without success. 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 He denies a history of erectile dysfunction and ejaculatory problems.    He just conceived via IVF!  Has 4 more embyros cryopreserved.    He was started on clomid and arimidex.  SA on these shows continued azoospermia.    Karyotype and Y chromosome microdeletion are normal  Lab Results   Component Value Date    TOTALTESTOST 941 2024    LABLH 2.4 2024    FSH 4.63 2024    ESTRADIOL 19 2024    PROLACTIN 9.9 2024      SA 24--2.3 cc/azoospermia  SA 24--2.5 cc/0.4 million per cc/50%/1%  SA (cryo report) 7/3/24--4.0 cc/0.1 million per cc/50%/NA  SA 10/14/24--12.5 cc/azoospermia    FOR REVIEW FROM PREVIOUS:    Mr. Christianson is a 28 y.o.  male who has been  to his wife for the past 2 years. They have been trying to achieve a pregnancy for the past 2 years but without success. 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 He denies a history of erectile dysfunction and ejaculatory problems.    Lab Results   Component Value Date    TOTALTESTOST 206 (L) 2024    LABLH 2.4 2024    FSH 4.63 2024    ESTRADIOL 20 2024    PROLACTIN 9.9 2024      SA 24--2.3 cc/azoospermia  SA 24--2.5 cc/0.4 million per cc/50%/1%  SA (cryo report) 7/3/24--4.0 cc/0.1 million per cc/50%/NA    Keiry Casarez is 20 years old. ( 05) Her menses are regular. She has not undergone prior hysterosalpingogram. She has achieved 0 prior pregnancies.  She sees Dr. Lucia.    The couple has not undergone prior intrauterine insemination procedures.    The couple has not  "undergone prior in-vitro fertilization procedures.    Kirit Christianson denies a history of exposure to harmful chemicals, toxins, and radiation.    No history of recent fevers greater than 101.5 degrees Farenheit.    No history of recent exposure to "wet heat."    No history of urological trauma or testicular torsion.    No history of prostatitis, epididymitis, and orchitis.    No history of post-pubertal mumps.    There is no known family history of fertility problems.    REVIEW OF SYSTEMS:     He denies headache, blurred vision, fever, nausea, vomiting, chills, abdominal pain, chest pain, sore throat, bleeding per rectum, cough, SOB, recent loss of consciousness, recent mental status changes, seizures, dizziness, or upper or lower extremity weakness.    PHYSICAL EXAM:     The patient generally appears in good health, is appropriately interactive, and is in no apparent distress.     Eyes: anicteric sclerae, moist conjunctivae; no lid-lag; PERRLA     HENT: Atraumatic; oropharynx clear with moist mucous membranes and no mucosal ulcerations;normal hard and soft palate.  No evidence of lymphadenopathy.    Neck: Trachea midline.  No thyromegaly.    Skin: No lesions.    Mental: Cooperative with normal affect.  Is oriented to time, place, and person.    Neuro: Grossly intact.    Chest: Normal inspiratory effort.   No accessory muscles.  No audible wheezes.  Respirations symmetric on inspiration and expiration.    Heart: Regular rhythm.      Abdomen:  Soft, non-tender. No masses or organomegaly. Bladder is not palpable. No evidence of flank discomfort. No evidence of inguinal hernia.    Genitourinary: Penis is normal with no evidence of plaques or induration. Urethral meatus is normal. Scrotum is normal. Testes are descended bilaterally with no evidence of abnormal masses or tenderness. Epididymis, vas deferens, and cord structures are normal bilaterally.  Testicular volume is approximately 18 cc on the R and 17 cc on the L.  " "Limited by body habitus.    Extremities: No cyanosis, clubbing, or edema.    IMPRESSION & PLAN:    Mr. Christianson is a 29 y.o.  male who has been  to his wife for the past 2 years. They have been trying to achieve a pregnancy for the past 2 years but without success. 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 He denies a history of erectile dysfunction and ejaculatory problems.    He just conceived via IVF!  Has 4 more embyros cryopreserved.    He was started on clomid and arimidex.  SA on these shows continued azoospermia.    Karyotype and Y chromosome microdeletion are normal  Lab Results   Component Value Date    TOTALTESTOST 941 08/19/2024    LABLH 2.4 07/01/2024    FSH 4.63 07/01/2024    ESTRADIOL 19 08/19/2024    PROLACTIN 9.9 07/01/2024      SA 5/14/24--2.3 cc/azoospermia  SA 5/24/24--2.5 cc/0.4 million per cc/50%/1%  SA (cryo report) 7/3/24--4.0 cc/0.1 million per cc/50%/NA  SA 10/14/24--12.5 cc/azoospermia    1.  Independently interpreted his labs and outside SA's today showing severe male factor infertility (azoospermia).  2.  Discussed that his T is low.  However, his wife is pregnant!  Can stop clomid and arimidex.  3.  RTC prn if they need to do IVF in the future.  4.  Recommend avoiding "wet heat."  5.  Recommend taking a multivitamin and 500 mg of vitamin c daily in addition to the multivitamin.  6. Visit today included increased complexity associated with the care of the episodic problem of severe male factor infertility addressed and managing the longitudinal care of the patient due to the serious and/or complex managed problem(s).     CC: Khari          "